# Patient Record
Sex: FEMALE | Race: WHITE | Employment: UNEMPLOYED | URBAN - METROPOLITAN AREA
[De-identification: names, ages, dates, MRNs, and addresses within clinical notes are randomized per-mention and may not be internally consistent; named-entity substitution may affect disease eponyms.]

---

## 2024-08-16 ENCOUNTER — OFFICE VISIT (OUTPATIENT)
Dept: PRIMARY CARE CLINIC | Age: 39
End: 2024-08-16
Payer: MEDICAID

## 2024-08-16 VITALS
SYSTOLIC BLOOD PRESSURE: 124 MMHG | DIASTOLIC BLOOD PRESSURE: 84 MMHG | HEIGHT: 63 IN | RESPIRATION RATE: 16 BRPM | BODY MASS INDEX: 36.04 KG/M2 | HEART RATE: 88 BPM | WEIGHT: 203.4 LBS | OXYGEN SATURATION: 98 %

## 2024-08-16 DIAGNOSIS — F41.9 ANXIETY: Primary | ICD-10-CM

## 2024-08-16 DIAGNOSIS — E66.09 CLASS 2 OBESITY DUE TO EXCESS CALORIES WITHOUT SERIOUS COMORBIDITY WITH BODY MASS INDEX (BMI) OF 35.0 TO 35.9 IN ADULT: ICD-10-CM

## 2024-08-16 DIAGNOSIS — G35 MS (MULTIPLE SCLEROSIS) (HCC): ICD-10-CM

## 2024-08-16 PROBLEM — E66.812 CLASS 2 OBESITY DUE TO EXCESS CALORIES WITHOUT SERIOUS COMORBIDITY WITH BODY MASS INDEX (BMI) OF 35.0 TO 35.9 IN ADULT: Status: ACTIVE | Noted: 2024-08-16

## 2024-08-16 PROCEDURE — 99214 OFFICE O/P EST MOD 30 MIN: CPT | Performed by: PHYSICIAN ASSISTANT

## 2024-08-16 ASSESSMENT — ENCOUNTER SYMPTOMS
COUGH: 0
SINUS PAIN: 0
DIARRHEA: 0
SHORTNESS OF BREATH: 0
BACK PAIN: 0
RHINORRHEA: 0
NAUSEA: 0
ABDOMINAL PAIN: 0
CONSTIPATION: 0

## 2024-08-16 NOTE — PROGRESS NOTES
[facility]  [facility] PRIMARY CARE  [address] [place] [id number]  Dept: [phone]  Dept Fax: [phone]    Litzy Chairez is a 38 y.o. female who presents today for her medical conditions/complaints as noted below.    Chief Complaint   Patient presents with    Medication Check    Weight Management       HPI:     Patient presents the office for medication follow-up.  At last office visit she was started on semaglutide.  Unfortunately insurance did not cover this and she is getting compounded version.  Since starting medication a couple weeks ago patient has noticed some mild changes.  She feels she is not as hungry and she is not reaching for excess snacks.  Weight has not changed too much although it is only than 2 weeks.  Denies any side effects to semaglutide since starting.    Otherwise, patient reports she recently did televisit for chlamydia exposure.  She is taking doxycycline as prescribed.  She will recheck urine in a couple weeks.  No new symptoms.    Patient has upcoming appointment with neurology for management of MS.    Patient reports anxiety is well-controlled.  She is compliant with venlafaxine and BuSpar.  No changes.  No concerns.    Blood pressure well-controlled.        No results found for: \"LABA1C\"          ( goal A1C is < 7)   No components found for: \"LABMICR\"  No components found for: \"LDLCHOLESTEROL\", \"LDLCALC\"    (goal LDL is <100)   No results found for: \"AST\", \"ALT\", \"BUN\", \"CR\"  BP Readings from Last 3 Encounters:   08/16/24 124/84   07/03/24 136/82   01/05/24 116/68          (goal 120/80)    History reviewed. No pertinent past medical history.   History reviewed. No pertinent surgical history.    History reviewed. No pertinent family history.    Social History     Tobacco Use    Smoking status: Never    Smokeless tobacco: Never   Substance Use Topics    Alcohol use: Not on file      Current Outpatient Medications   Medication Sig Dispense Refill

## 2024-08-21 NOTE — PROGRESS NOTES
[facility] NEUROLOGY  [address] [facility] [address]  Dept: [phone]    PATIENT NAME: Litzy Chairez  PATIENT MRN: [id number]  PRIMARY CARE PHYSICIAN: Ag De La Vega PA-C    HPI:      Litzy Chairez is a 38 y.o. female who presents to clinic today for evaluation of multiple sclerosis.  She has seeing Dr. Trixie Tapia and Dr. Alexia Vo in the past.  I reviewed their notes in anticipation of this visit and her history is summarized as follows:    Litzy Chairez initially underwent MRI of the brain for headache and concern for pituitary tumor.  This MRI revealed T2 hyperintense lesions concerning for multiple sclerosis. She then had CSF studies, which revealed a mild lymphocytic pleocytosis and unpaired oligoclonal bands.  She was then started on glatiramer acetate 40 mg subcu 3 times weekly in May 2016.  She tolerated this medication okay.  Unfortunately, she had several relapses after starting this medication.  She recalls several episodes of numbness and tingling in the arms and legs.  At that time, she had an IUD in place with no plans to get pregnant.      In June 2017, Litzy Chairez had an attack consisting of left facial numbness and vision change in the left eye.  In September 2017, she developed vertigo.  She was found to have a nonenhancing infratentorial lesion, correlating with her new symptoms.  She was treated with 5 days of IV Solu-Medrol, but had difficulty tolerating this medication due to psychiatric symptoms.  At that time, she reported that she wished to become pregnant.  It was recommended that she resume glatiramer acetate.  Unfortunately, in March 2018 she had symptoms concerning for a spinal cord relapse with increased tone in the right leg and tremor in the right arm and leg.  She also had decreased sensation in the groin area and new urinary symptoms.  In 2018, she started dimethyl fumarate, but unfortunately had an allergic reaction within

## 2024-08-22 ENCOUNTER — OFFICE VISIT (OUTPATIENT)
Dept: NEUROLOGY | Age: 39
End: 2024-08-22
Payer: COMMERCIAL

## 2024-08-22 ENCOUNTER — HOSPITAL ENCOUNTER (OUTPATIENT)
Age: 39
Setting detail: SPECIMEN
Discharge: HOME | End: 2024-08-22

## 2024-08-22 VITALS
DIASTOLIC BLOOD PRESSURE: 90 MMHG | BODY MASS INDEX: 35.93 KG/M2 | HEIGHT: 63 IN | SYSTOLIC BLOOD PRESSURE: 125 MMHG | HEART RATE: 80 BPM | WEIGHT: 202.8 LBS

## 2024-08-22 DIAGNOSIS — Z79.899 HIGH RISK MEDICATION USE: ICD-10-CM

## 2024-08-22 DIAGNOSIS — E55.9 VITAMIN D DEFICIENCY: ICD-10-CM

## 2024-08-22 DIAGNOSIS — Z11.59 NEED FOR HEPATITIS B SCREENING TEST: ICD-10-CM

## 2024-08-22 DIAGNOSIS — G35 MULTIPLE SCLEROSIS (HCC): ICD-10-CM

## 2024-08-22 DIAGNOSIS — G35 MULTIPLE SCLEROSIS (HCC): Primary | ICD-10-CM

## 2024-08-22 LAB
25(OH)D3 SERPL-MCNC: 19 NG/ML (ref 30–100)
ALBUMIN SERPL-MCNC: 4.7 G/DL (ref 3.5–5.2)
ALBUMIN/GLOB SERPL: 2 {RATIO} (ref 1–2.5)
ALP SERPL-CCNC: 101 U/L (ref 35–104)
ALT SERPL-CCNC: 12 U/L (ref 10–35)
ANION GAP SERPL CALCULATED.3IONS-SCNC: 9 MMOL/L (ref 9–16)
AST SERPL-CCNC: 20 U/L (ref 10–35)
BASOPHILS # BLD: 0.09 K/UL (ref 0–0.2)
BASOPHILS NFR BLD: 1 % (ref 0–2)
BILIRUB SERPL-MCNC: 0.4 MG/DL (ref 0–1.2)
BUN SERPL-MCNC: 13 MG/DL (ref 6–20)
CALCIUM SERPL-MCNC: 9.6 MG/DL (ref 8.6–10.4)
CHLORIDE SERPL-SCNC: 102 MMOL/L (ref 98–107)
CO2 SERPL-SCNC: 29 MMOL/L (ref 20–31)
CREAT SERPL-MCNC: 0.7 MG/DL (ref 0.5–0.9)
EOSINOPHIL # BLD: 0.09 K/UL (ref 0–0.44)
EOSINOPHILS RELATIVE PERCENT: 1 % (ref 1–4)
ERYTHROCYTE [DISTWIDTH] IN BLOOD BY AUTOMATED COUNT: 13.2 % (ref 11.8–14.4)
GFR, ESTIMATED: >90 ML/MIN/1.73M2
GLUCOSE SERPL-MCNC: 81 MG/DL (ref 74–99)
HBV CORE AB SER QL: NONREACTIVE
HBV SURFACE AB SERPL IA-ACNC: 13.7 MIU/ML
HBV SURFACE AG SERPL QL IA: NONREACTIVE
HCT VFR BLD AUTO: 42.6 % (ref 36.3–47.1)
HGB BLD-MCNC: 14 G/DL (ref 11.9–15.1)
IGG SERPL-MCNC: 886 MG/DL (ref 700–1600)
IGM SERPL-MCNC: 84 MG/DL (ref 40–230)
IMM GRANULOCYTES # BLD AUTO: <0.03 K/UL (ref 0–0.3)
IMM GRANULOCYTES NFR BLD: 0 %
LYMPHOCYTES NFR BLD: 2.13 K/UL (ref 1.1–3.7)
LYMPHOCYTES RELATIVE PERCENT: 27 % (ref 24–43)
MCH RBC QN AUTO: 28.5 PG (ref 25.2–33.5)
MCHC RBC AUTO-ENTMCNC: 32.9 G/DL (ref 28.4–34.8)
MCV RBC AUTO: 86.8 FL (ref 82.6–102.9)
MONOCYTES NFR BLD: 0.51 K/UL (ref 0.1–1.2)
MONOCYTES NFR BLD: 7 % (ref 3–12)
NEUTROPHILS NFR BLD: 64 % (ref 36–65)
NEUTS SEG NFR BLD: 4.93 K/UL (ref 1.5–8.1)
NRBC BLD-RTO: 0 PER 100 WBC
PLATELET # BLD AUTO: 343 K/UL (ref 138–453)
PMV BLD AUTO: 9.7 FL (ref 8.1–13.5)
POTASSIUM SERPL-SCNC: 4.5 MMOL/L (ref 3.7–5.3)
PROT SERPL-MCNC: 7.1 G/DL (ref 6.6–8.7)
RBC # BLD AUTO: 4.91 M/UL (ref 3.95–5.11)
SODIUM SERPL-SCNC: 140 MMOL/L (ref 136–145)
WBC OTHER # BLD: 7.8 K/UL (ref 3.5–11.3)

## 2024-08-22 PROCEDURE — G8417 CALC BMI ABV UP PARAM F/U: HCPCS | Performed by: PSYCHIATRY & NEUROLOGY

## 2024-08-22 PROCEDURE — 1036F TOBACCO NON-USER: CPT | Performed by: PSYCHIATRY & NEUROLOGY

## 2024-08-22 PROCEDURE — 99205 OFFICE O/P NEW HI 60 MIN: CPT | Performed by: PSYCHIATRY & NEUROLOGY

## 2024-08-22 PROCEDURE — G8427 DOCREV CUR MEDS BY ELIG CLIN: HCPCS | Performed by: PSYCHIATRY & NEUROLOGY

## 2024-08-22 NOTE — PATIENT INSTRUCTIONS
Kesimpta (ofatumumab):     - Kesimpta is a monoclonal antibody directed against anti-CD20.  This medication depletes the adult B cells and calms the immune system.  Kesimpta works in a similar way to some other multiple sclerosis medications including ocrelizumab (Ocrevus) and ublituximab (Briumvi).  With Kesimpta, the B cells may grow back more quickly than with these other medications.     - Kesimpta is given through a subcutaneous injection in an autoinjector.  It is given on the following schedule:     Start Kesimpta (ofatumumab) 20 mg/0.4 mL subcutaneous injection weekly x 3 weeks (weeks 0, 1, and 2).  On week 4, begin 20 mg  subcutaneous every 4 weeks.      - Kesimpta was shown to be a highly effective medicaiton through a two clinical trials called ASCLEPIOS I and II  in which Kesimpta was compared against another medication, Aubagio (teriflunomide).  Teriflunomide reduces the annual relapse rate by about 30 to 40%.   In the clinical trials, Kesimpta was about 50% more effective than Aubagio at stopping relapses.  It is very effective at stopping new lesions from forming as well.     - Risks of taking Kesimpta include infections and injection reactions.     Dosing for Kesimpta Start:    Start Kesimpta (ofatumumab) 20 mg/0.4 mL subcutaneous injection weekly x 3 weeks (weeks 0, 1, and 2).  On week 4, begin 20 mg  subcutaneous every 4 weeks.

## 2024-08-25 LAB
QUANTI TB GOLD PLUS: NEGATIVE
QUANTI TB1 MINUS NIL: 0.01 IU/ML
QUANTI TB2 MINUS NIL: 0.03 IU/ML
QUANTIFERON MITOGEN: 9.89 IU/ML
QUANTIFERON NIL: 0.11 IU/ML

## 2024-09-20 ENCOUNTER — OFFICE VISIT (OUTPATIENT)
Dept: PRIMARY CARE CLINIC | Age: 39
End: 2024-09-20
Payer: COMMERCIAL

## 2024-09-20 VITALS
HEIGHT: 63 IN | BODY MASS INDEX: 34.87 KG/M2 | SYSTOLIC BLOOD PRESSURE: 112 MMHG | WEIGHT: 196.8 LBS | OXYGEN SATURATION: 98 % | HEART RATE: 95 BPM | DIASTOLIC BLOOD PRESSURE: 84 MMHG

## 2024-09-20 DIAGNOSIS — F41.9 ANXIETY: ICD-10-CM

## 2024-09-20 DIAGNOSIS — G35 MS (MULTIPLE SCLEROSIS) (HCC): ICD-10-CM

## 2024-09-20 DIAGNOSIS — E66.9 OBESITY (BMI 30-39.9): Primary | ICD-10-CM

## 2024-09-20 PROBLEM — E66.09 CLASS 2 OBESITY DUE TO EXCESS CALORIES WITHOUT SERIOUS COMORBIDITY WITH BODY MASS INDEX (BMI) OF 35.0 TO 35.9 IN ADULT: Status: RESOLVED | Noted: 2024-08-16 | Resolved: 2024-09-20

## 2024-09-20 PROBLEM — E66.812 CLASS 2 OBESITY DUE TO EXCESS CALORIES WITHOUT SERIOUS COMORBIDITY WITH BODY MASS INDEX (BMI) OF 35.0 TO 35.9 IN ADULT: Status: RESOLVED | Noted: 2024-08-16 | Resolved: 2024-09-20

## 2024-09-20 PROCEDURE — 99214 OFFICE O/P EST MOD 30 MIN: CPT | Performed by: PHYSICIAN ASSISTANT

## 2024-09-20 PROCEDURE — G8427 DOCREV CUR MEDS BY ELIG CLIN: HCPCS | Performed by: PHYSICIAN ASSISTANT

## 2024-09-20 PROCEDURE — G8417 CALC BMI ABV UP PARAM F/U: HCPCS | Performed by: PHYSICIAN ASSISTANT

## 2024-09-20 PROCEDURE — 1036F TOBACCO NON-USER: CPT | Performed by: PHYSICIAN ASSISTANT

## 2024-09-20 ASSESSMENT — ENCOUNTER SYMPTOMS
SINUS PAIN: 0
NAUSEA: 0
CONSTIPATION: 0
COUGH: 0
RHINORRHEA: 0
BACK PAIN: 0
ABDOMINAL PAIN: 0
DIARRHEA: 0
SHORTNESS OF BREATH: 0
VOMITING: 0

## 2024-09-20 ASSESSMENT — ANXIETY QUESTIONNAIRES
2. NOT BEING ABLE TO STOP OR CONTROL WORRYING: NOT AT ALL
4. TROUBLE RELAXING: NOT AT ALL
7. FEELING AFRAID AS IF SOMETHING AWFUL MIGHT HAPPEN: NOT AT ALL
6. BECOMING EASILY ANNOYED OR IRRITABLE: NOT AT ALL
5. BEING SO RESTLESS THAT IT IS HARD TO SIT STILL: NOT AT ALL
3. WORRYING TOO MUCH ABOUT DIFFERENT THINGS: NOT AT ALL
GAD7 TOTAL SCORE: 0
1. FEELING NERVOUS, ANXIOUS, OR ON EDGE: NOT AT ALL

## 2024-09-20 ASSESSMENT — PATIENT HEALTH QUESTIONNAIRE - PHQ9
SUM OF ALL RESPONSES TO PHQ QUESTIONS 1-9: 0
2. FEELING DOWN, DEPRESSED OR HOPELESS: NOT AT ALL
SUM OF ALL RESPONSES TO PHQ QUESTIONS 1-9: 0
1. LITTLE INTEREST OR PLEASURE IN DOING THINGS: NOT AT ALL
SUM OF ALL RESPONSES TO PHQ9 QUESTIONS 1 & 2: 0

## 2024-10-04 DIAGNOSIS — F32.1 MODERATE MAJOR DEPRESSION (HCC): ICD-10-CM

## 2024-10-04 DIAGNOSIS — F41.9 ANXIETY: ICD-10-CM

## 2024-10-04 RX ORDER — VENLAFAXINE HYDROCHLORIDE 225 MG/1
225 TABLET, EXTENDED RELEASE ORAL DAILY
Qty: 90 TABLET | Refills: 1 | Status: SHIPPED | OUTPATIENT
Start: 2024-10-04

## 2024-10-04 NOTE — TELEPHONE ENCOUNTER
Patient comment: Arnulfo Luong, I think this is still set to refill at [facility] in [place] but wondering if you can send the refill to [facility] on [address]?  I took my last pill this morning and when I went to  refills last night realized it was only oxybutynin and buspirone. Thank you!     LAST VISIT:   9/20/2024     Future Appointments   Date Time Provider Department Center   11/21/2024  3:20 PM Laurence Neville DO [facility] NEUR Neurology -   1/22/2025  8:00 AM gA De La Vega, PA-C [facility] ECC DEP

## 2024-12-03 ENCOUNTER — TELEPHONE (OUTPATIENT)
Dept: NEUROLOGY | Age: 39
End: 2024-12-03

## 2024-12-03 NOTE — TELEPHONE ENCOUNTER
Litzy hasn't sent back her Kesimpta Start form yet but her labs are done and she is ready to start except for the MRI.  We are not able to obtain the MRI images from [facility].  Do you want to sign the RX and get that sent on to start prior authorization or wait for her to get the MRI completed?

## 2024-12-04 NOTE — TELEPHONE ENCOUNTER
I called Litzy and she said that her company has changed owners and they will be switching health insurance. She said it will be a better insurance and she prefers to wait on things until that is in place.  I told her that we will wait to hear from her then and she can either call us or messge us. She does have the information to get the MRI scheduled and she will do that once the insurance is in place.

## 2025-02-21 DIAGNOSIS — F32.1 MODERATE MAJOR DEPRESSION (HCC): ICD-10-CM

## 2025-02-21 DIAGNOSIS — F41.9 ANXIETY: ICD-10-CM

## 2025-02-21 RX ORDER — BUSPIRONE HYDROCHLORIDE 10 MG/1
10 TABLET ORAL 2 TIMES DAILY
Qty: 180 TABLET | Refills: 1 | Status: SHIPPED | OUTPATIENT
Start: 2025-02-21

## 2025-02-21 NOTE — TELEPHONE ENCOUNTER
Litzy Chairez is requesting a refill on the following medication(s):  Requested Prescriptions     Pending Prescriptions Disp Refills    busPIRone (BUSPAR) 10 MG tablet 180 tablet 1     Sig: Take 1 tablet by mouth 2 times daily       Last Visit Date (If Applicable):  9/20/2024    Next Visit Date:    Visit date not found

## 2025-03-09 DIAGNOSIS — F41.9 ANXIETY: ICD-10-CM

## 2025-03-10 DIAGNOSIS — F32.1 MODERATE MAJOR DEPRESSION (HCC): ICD-10-CM

## 2025-03-10 DIAGNOSIS — F41.9 ANXIETY: ICD-10-CM

## 2025-03-10 RX ORDER — OXYBUTYNIN CHLORIDE 10 MG/1
10 TABLET, EXTENDED RELEASE ORAL DAILY
Qty: 90 TABLET | Refills: 1 | Status: SHIPPED | OUTPATIENT
Start: 2025-03-10

## 2025-03-10 RX ORDER — VENLAFAXINE HYDROCHLORIDE 225 MG/1
1 TABLET, EXTENDED RELEASE ORAL DAILY
Qty: 90 TABLET | Refills: 2 | Status: SHIPPED | OUTPATIENT
Start: 2025-03-10

## 2025-03-10 NOTE — TELEPHONE ENCOUNTER
Litzy Chairez is requesting a refill on the following medication(s):  Requested Prescriptions     Pending Prescriptions Disp Refills    venlafaxine 225 MG extended release tablet [Pharmacy Med Name: VENLAFAXINE HCL  MG TAB] 90 tablet 2     Sig: TAKE 1 TABLET BY MOUTH EVERY DAY       Last Visit Date (If Applicable):  9/20/2024    Next Visit Date:    Visit date not found

## 2025-03-10 NOTE — TELEPHONE ENCOUNTER
Litzy Chairez is requesting a refill on the following medication(s):  Requested Prescriptions     Pending Prescriptions Disp Refills    oxyBUTYnin (DITROPAN-XL) 10 MG extended release tablet 90 tablet 1     Sig: Take 1 tablet by mouth daily       Last Visit Date (If Applicable):  9/20/2024    Next Visit Date:    Visit date not found

## 2025-07-14 ENCOUNTER — HOSPITAL ENCOUNTER (OUTPATIENT)
Dept: MRI IMAGING | Age: 40
Discharge: HOME | End: 2025-07-16
Attending: PSYCHIATRY & NEUROLOGY
Payer: COMMERCIAL

## 2025-07-14 DIAGNOSIS — Z79.899 HIGH RISK MEDICATION USE: ICD-10-CM

## 2025-07-14 DIAGNOSIS — G35 MULTIPLE SCLEROSIS (HCC): ICD-10-CM

## 2025-07-14 PROCEDURE — 70553 MRI BRAIN STEM W/O & W/DYE: CPT

## 2025-07-14 PROCEDURE — 6360000004 HC RX CONTRAST MEDICATION: Performed by: PSYCHIATRY & NEUROLOGY

## 2025-07-14 PROCEDURE — A9579 GAD-BASE MR CONTRAST NOS,1ML: HCPCS | Performed by: PSYCHIATRY & NEUROLOGY

## 2025-07-14 RX ORDER — SODIUM CHLORIDE 0.9 % (FLUSH) 0.9 %
10 SYRINGE (ML) INJECTION PRN
Status: DISCONTINUED | OUTPATIENT
Start: 2025-07-14 | End: 2025-07-17 | Stop reason: HOSPADM

## 2025-07-14 RX ORDER — GADOTERIDOL 279.3 MG/ML
16 INJECTION INTRAVENOUS
Status: COMPLETED | OUTPATIENT
Start: 2025-07-14 | End: 2025-07-14

## 2025-07-14 RX ADMIN — GADOTERIDOL 16 ML: 279.3 INJECTION, SOLUTION INTRAVENOUS at 18:05

## 2025-07-21 ENCOUNTER — RESULTS FOLLOW-UP (OUTPATIENT)
Dept: NEUROLOGY | Age: 40
End: 2025-07-21

## 2025-07-21 NOTE — PROGRESS NOTES
[facility] NEUROLOGY  [address] [facility] [address]  Dept: [phone]    PATIENT NAME: Litzy Chairez  PATIENT MRN: [id number]  PRIMARY CARE PHYSICIAN: Ag De La Vega PA-C    History     Litzy Chairez is a 39 y.o. female who I initially saw in clinic on 8/22/2024 for evaluation of multiple sclerosis.  She has seeing Dr. Trixie Tapia and Dr. Alexia Vo in the past.  I reviewed their notes in anticipation of this visit and her history is summarized as follows:     Litzy Chairez initially underwent MRI of the brain for headache and concern for pituitary tumor.  This MRI revealed T2 hyperintense lesions concerning for multiple sclerosis. She then had CSF studies, which revealed a mild lymphocytic pleocytosis and unpaired oligoclonal bands.  She was then started on glatiramer acetate 40 mg subcu 3 times weekly in May 2016.  She tolerated this medication okay.  Unfortunately, she had several relapses after starting this medication.  She recalls several episodes of numbness and tingling in the arms and legs.  At that time, she had an IUD in place with no plans to get pregnant.       In June 2017, Litzy Chairez had an attack consisting of left facial numbness and vision change in the left eye.  In September 2017, she developed vertigo.  She was found to have a nonenhancing infratentorial lesion, correlating with her new symptoms.  She was treated with 5 days of IV Solu-Medrol, but had difficulty tolerating this medication due to psychiatric symptoms.  At that time, she reported that she wished to become pregnant.  It was recommended that she resume glatiramer acetate.  Unfortunately, in March 2018 she had symptoms concerning for a spinal cord relapse with increased tone in the right leg and tremor in the right arm and leg.  She also had decreased sensation in the groin area and new urinary symptoms.  In 2018, she started dimethyl fumarate, but unfortunately had an

## 2025-07-22 ENCOUNTER — OFFICE VISIT (OUTPATIENT)
Dept: NEUROLOGY | Age: 40
End: 2025-07-22
Payer: COMMERCIAL

## 2025-07-22 VITALS
DIASTOLIC BLOOD PRESSURE: 88 MMHG | HEART RATE: 87 BPM | BODY MASS INDEX: 31.79 KG/M2 | HEIGHT: 63 IN | WEIGHT: 179.4 LBS | SYSTOLIC BLOOD PRESSURE: 122 MMHG

## 2025-07-22 DIAGNOSIS — Z11.59 NEED FOR HEPATITIS B SCREENING TEST: ICD-10-CM

## 2025-07-22 DIAGNOSIS — E55.9 VITAMIN D DEFICIENCY: ICD-10-CM

## 2025-07-22 DIAGNOSIS — G35 MULTIPLE SCLEROSIS (HCC): Primary | ICD-10-CM

## 2025-07-22 DIAGNOSIS — Z79.899 HIGH RISK MEDICATION USE: ICD-10-CM

## 2025-07-22 PROCEDURE — 99215 OFFICE O/P EST HI 40 MIN: CPT | Performed by: PSYCHIATRY & NEUROLOGY

## 2025-07-22 PROCEDURE — G8427 DOCREV CUR MEDS BY ELIG CLIN: HCPCS | Performed by: PSYCHIATRY & NEUROLOGY

## 2025-07-22 PROCEDURE — 1036F TOBACCO NON-USER: CPT | Performed by: PSYCHIATRY & NEUROLOGY

## 2025-07-22 PROCEDURE — G8417 CALC BMI ABV UP PARAM F/U: HCPCS | Performed by: PSYCHIATRY & NEUROLOGY

## 2025-07-22 NOTE — PROGRESS NOTES
LSW spoke with patient who reports no SDOH concerns at this time.   Patient was provided an updated MS support group flyer.

## 2025-08-24 DIAGNOSIS — F41.9 ANXIETY: ICD-10-CM

## 2025-08-24 DIAGNOSIS — F32.1 MODERATE MAJOR DEPRESSION (HCC): ICD-10-CM

## 2025-08-26 RX ORDER — BUSPIRONE HYDROCHLORIDE 10 MG/1
10 TABLET ORAL 2 TIMES DAILY
Qty: 60 TABLET | Refills: 5 | Status: SHIPPED | OUTPATIENT
Start: 2025-08-26